# Patient Record
Sex: FEMALE | Race: WHITE | NOT HISPANIC OR LATINO | Employment: OTHER | URBAN - METROPOLITAN AREA
[De-identification: names, ages, dates, MRNs, and addresses within clinical notes are randomized per-mention and may not be internally consistent; named-entity substitution may affect disease eponyms.]

---

## 2021-05-28 ENCOUNTER — RECORDS - HEALTHEAST (OUTPATIENT)
Dept: ADMINISTRATIVE | Facility: CLINIC | Age: 71
End: 2021-05-28

## 2022-07-01 ENCOUNTER — HOSPITAL ENCOUNTER (EMERGENCY)
Facility: CLINIC | Age: 72
Discharge: HOME | End: 2022-07-01
Attending: EMERGENCY MEDICINE | Admitting: EMERGENCY MEDICINE
Payer: MEDICARE

## 2022-07-01 ENCOUNTER — APPOINTMENT (OUTPATIENT)
Dept: CT IMAGING | Facility: CLINIC | Age: 72
End: 2022-07-01
Attending: EMERGENCY MEDICINE
Payer: MEDICARE

## 2022-07-01 VITALS
WEIGHT: 104 LBS | DIASTOLIC BLOOD PRESSURE: 73 MMHG | HEIGHT: 61 IN | TEMPERATURE: 97.7 F | OXYGEN SATURATION: 89 % | RESPIRATION RATE: 25 BRPM | BODY MASS INDEX: 19.63 KG/M2 | SYSTOLIC BLOOD PRESSURE: 155 MMHG | HEART RATE: 84 BPM

## 2022-07-01 DIAGNOSIS — R55 SYNCOPE, UNSPECIFIED SYNCOPE TYPE: ICD-10-CM

## 2022-07-01 PROCEDURE — 70450 CT HEAD/BRAIN W/O DYE: CPT

## 2022-07-01 PROCEDURE — 99284 EMERGENCY DEPT VISIT MOD MDM: CPT | Mod: 25

## 2022-07-01 PROCEDURE — 93005 ELECTROCARDIOGRAM TRACING: CPT | Performed by: EMERGENCY MEDICINE

## 2022-07-02 NOTE — ED TRIAGE NOTES
Arrival via EMS for LOC and fall at home. Was at [facility] earlier today for possible GI bleed, was sent home and lost consciousness getting out of the car, and possibly again once in the house. EMS noticed difficulty arousing patient and pinpoint pupils, narcan administered en route to the hospital. Pt currently alert and oriented, no obvious bruising or lacerations to the scalp but pt does not remember if she hit her head or not.      Triage Assessment     Row Name 07/01/22 2058       Triage Assessment (Adult)    Airway WDL WDL       Respiratory WDL    Respiratory WDL WDL       Skin Circulation/Temperature WDL    Skin Circulation/Temperature WDL WDL       Cardiac WDL    Cardiac WDL WDL       Peripheral/Neurovascular WDL    Peripheral Neurovascular WDL WDL       Cognitive/Neuro/Behavioral WDL    Cognitive/Neuro/Behavioral WDL X;arousability;level of consciousness    Level of Consciousness lethargic;confused  PTA       [place] Coma Scale    Best Eye Response 4-->(E4) spontaneous    Best Motor Response 6-->(M6) obeys commands    Best Verbal Response 5-->(V5) oriented    Long Coma Scale Score 15

## 2022-07-02 NOTE — DISCHARGE INSTRUCTIONS
Your symptoms tonight likely resulted from the effects of the narcotic pain medication you received earlier.  Your pupils were very small and responded to the Narcan and you became appropriate.  CT imaging was unremarkable.

## 2022-07-02 NOTE — ED NOTES
Placed orders for EKG.  Date of exam was on 07/01/2022 at 21:12  Lili Yañez RN 7/2/2022 3:36 AM

## 2022-07-02 NOTE — ED PROVIDER NOTES
EMERGENCY DEPARTMENT ENCOUNTER      NAME: Darleen France  AGE: 72 year old female  YOB: 1950  MRN: [id number]  EVALUATION DATE & TIME: 7/1/2022  8:45 PM    PCP: No primary care provider on file.    ED PROVIDER: Jairo Jeffries M.D.      Chief Complaint   Patient presents with     Loss of Consciousness     Fall         FINAL IMPRESSION:  Narcotic excess  Fall  Hypoxia    ED COURSE & MEDICAL DECISION MAKING:    Pertinent Labs & Imaging studies reviewed. (See chart for details)  72 year old female presents to the Emergency Department for evaluation of altered mentation/syncope.  Patient had returned home after extensive evaluation at [facility] which was essentially unremarkable.  Per report on arriving home she was markedly altered and fell/syncopized.  EMS was called.  Pinpoint pupils noted.  Given Narcan with improvement in wellbeing.  Patient made a trauma alert here as she does take Plavix routinely.  Uncertain if she hit her head.  We will proceed with CT imaging.  On limited exam patient sitting comfortably on a commode.  No outward signs of scalp injury.  Will complete exam after imaging.. Patient appears non toxic with stable vitals signs.  EKG obtained on arrival is unremarkable    9:05 PM I met with the patient for the initial interview and physical examination. Discussed plan for treatment and workup in the ED.    9:49 PM Reevaluated patient.  Patient reports stumbling and falling.  Uncertain as to the circumstances.  Patient did receiving pain medications during her work-up at [facility].  This likely the source of her altered mentation with response to Narcan.  Plan is for discharge if CT imaging is normal.  Patient exam is benign..  10:02 PM.  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  Awaiting definitive report.  10:07 PM.  CT unremarkable.  We will proceed with outpatient management.  10:10 PM.  Patient being prepared for discharge.  Nurse reports  patient was resting oxygenation dropped to the low 80% range.  Placed on 2 L with normalization.  Patient reports not being on oxygen typically.  Patient had been oxygenating appropriately when not at rest and talking.  We will ambulate patient.  Could be transient hypoxia due to the resting state.  Patient remains alert and appropriate.  10:20 PM.  Patient ambulated.  Oxygenation remained acceptable with lowest being 89%.  In the setting COPD this is not unremarkable.  We will continue outpatient management.  Recommendation follow-up with her pulmonologist.  At the conclusion of the encounter I discussed the results of all of the tests and the disposition. The questions were answered and return precautions provided. The patient or family acknowledged understanding and was agreeable with the care plan.       PPE: Provider wore gloves, N95 mask, eye protection, surgical cap.     MEDICATIONS GIVEN IN THE EMERGENCY:  Medications - No data to display    NEW PRESCRIPTIONS STARTED AT TODAY'S ER VISIT  New Prescriptions    No medications on file          =================================================================    HPI    Patient information was obtained from: Nurse    Use of Intrepreter: N/A          Darleen France is a 72 year old female with a pertient medical history of HLD and DM who presents to the ED for evaluation of fall.    Upon chart review: Patient was seen  7/1/22 at [facility] ED for evaluation of abdominal pain. CT abdomen pelvis with contrast impressions were (1) large hiatal hernia and (2) no  Abscess or obvious bowel inflammation. Patient was discharged home with return precautions.     As patient arrived home following discharge from the ED she lost consciousness getting out of her car. EMS was called and upon arrival  Patient was difficult to arouse and she had noted pinpoint pupils. EMS administered narcan en route which improved patient's responsiveness. Patient is unsure if she hit her head, and  she does not note any lacerations, bruising, or radiating/localized pain. Patient is on blood thinners. No other complaints at this time.       REVIEW OF SYSTEMS   Constitutional:  Denies fever, chills  Respiratory:  Denies productive cough or increased work of breathing  Cardiovascular:  Denies chest pain, palpitations  GI:  Denies abdominal pain, nausea, vomiting, or change in bowel or bladder habits   Musculoskeletal:  Denies any new muscle/joint swelling  Skin:  Denies rash   Neurologic:  Denies focal weakness. Positive for loss of consciousness.   All systems negative except as marked.     PAST MEDICAL HISTORY:  No past medical history on file.    PAST SURGICAL HISTORY:  No past surgical history on file.      CURRENT MEDICATIONS:    No current facility-administered medications for this encounter.  No current outpatient medications on file.    ALLERGIES:  Not on File    FAMILY HISTORY:  No family history on file.    SOCIAL HISTORY:   Social History     Socioeconomic History     Marital status:        VITALS:  Patient Vitals for the past 24 hrs:   BP Temp Temp src Pulse Resp SpO2   07/01/22 2049 (!) 156/73 97.7  F (36.5  C) Oral 82 16 94 %        PHYSICAL EXAM    Constitutional:  Awake, alert, in no apparent distress  HENT:  Normocephalic, Atraumatic. Bilateral external ears normal. Oropharynx moist. Nose normal. Neck- Normal range of motion with no guarding, No midline cervical tenderness, Supple, No stridor.   Eyes:  PERRL, EOMI with no signs of entrapment, Conjunctiva normal, No discharge.   Respiratory:  Normal breath sounds, No respiratory distress, No wheezing.    Cardiovascular:  Normal heart rate, Normal rhythm, No appreciable rubs or gallops.   GI:  Soft, No tenderness, No distension, No palpable masses  Musculoskeletal:  Intact distal pulses, No edema. Good range of motion in all major joints. No tenderness to palpation or major deformities noted.  Integument:  Warm, Dry, No erythema, No rash.    Neurologic:  Alert & oriented, Normal motor function, Normal sensory function, No focal deficits noted.   Psychiatric:  Affect normal, Judgment normal, Mood normal.     LAB:  All pertinent labs reviewed and interpreted.       RADIOLOGY:  Reviewed all pertinent imaging. Please see official radiology report.  Head CT w/o contrast    Result Date: 7/1/2022  EXAM: CT HEAD W/O CONTRAST LOCATION: [facility] DATE/TIME: 7/1/2022 9:19 PM INDICATION: Syncope, takes blood thinners COMPARISON: 07/21/2020 TECHNIQUE: Routine CT Head without IV contrast. Multiplanar reformats. Dose reduction techniques were used. FINDINGS: INTRACRANIAL CONTENTS: No intracranial hemorrhage, extraaxial collection, or mass effect.  No CT evidence of acute infarct. Chronic encephalomalacia and gliosis in the left temporal lobe. Normal ventricles and sulci. VISUALIZED ORBITS/SINUSES/MASTOIDS: No intraorbital abnormality. No paranasal sinus mucosal disease. No middle ear or mastoid effusion. BONES/SOFT TISSUES: No acute abnormality.     IMPRESSION: 1.  No acute intracranial process.    EKG:    Normal sinus rhythm.  Rate of 75.  Normal QRS.  Normal ST segments.  No prior tracing.  I have independently reviewed and interpreted the EKG(s) documented above.        I, Carey Long, am serving as a scribe to document services personally performed by Jairo Jeffries MD, based on my observation and the provider's statements to me. I, Jairo Jeffries MD attest that Carey Long is acting in a scribe capacity, has observed my performance of the services and has documented them in accordance with my direction.    Jairo Jeffries M.D.  Emergency Medicine  [facility] EMERGENCY ROOM     Jairo Jeffries MD  07/01/22 2207       Jairo Jeffries MD  07/01/22 2209       Jairo Jeffries MD  07/01/22 2217

## 2022-07-02 NOTE — ED NOTES
Patient ambulated about 30 feet with no difficulties   Denies feeling lightheaded/dizzy, or feeling like passing out   Oxygen saturation remained between 89-92% while walking   Heart rate remained between 92-95 BPM while walking   ED physician notified   ED physician ok for patient to go home

## 2023-01-08 ENCOUNTER — NURSE TRIAGE (OUTPATIENT)
Dept: NURSING | Facility: CLINIC | Age: 73
End: 2023-01-08

## 2023-01-08 ENCOUNTER — HOSPITAL ENCOUNTER (EMERGENCY)
Facility: CLINIC | Age: 73
Discharge: HOME | End: 2023-01-08
Attending: EMERGENCY MEDICINE | Admitting: EMERGENCY MEDICINE
Payer: MEDICARE

## 2023-01-08 ENCOUNTER — APPOINTMENT (OUTPATIENT)
Dept: RADIOLOGY | Facility: CLINIC | Age: 73
End: 2023-01-08
Attending: EMERGENCY MEDICINE
Payer: MEDICARE

## 2023-01-08 VITALS
WEIGHT: 108 LBS | HEIGHT: 61 IN | HEART RATE: 80 BPM | OXYGEN SATURATION: 93 % | TEMPERATURE: 98.6 F | BODY MASS INDEX: 20.39 KG/M2 | RESPIRATION RATE: 18 BRPM | SYSTOLIC BLOOD PRESSURE: 154 MMHG | DIASTOLIC BLOOD PRESSURE: 74 MMHG

## 2023-01-08 DIAGNOSIS — S52.531A CLOSED COLLES' FRACTURE OF RIGHT RADIUS, INITIAL ENCOUNTER: ICD-10-CM

## 2023-01-08 PROCEDURE — 99284 EMERGENCY DEPT VISIT MOD MDM: CPT | Mod: 25

## 2023-01-08 PROCEDURE — 73110 X-RAY EXAM OF WRIST: CPT | Mod: RT

## 2023-01-08 PROCEDURE — 25600 CLTX DST RDL FX/EPHYS SEP WO: CPT | Mod: RT

## 2023-01-08 PROCEDURE — 250N000013 HC RX MED GY IP 250 OP 250 PS 637: Performed by: EMERGENCY MEDICINE

## 2023-01-08 RX ORDER — OXYCODONE HYDROCHLORIDE 5 MG/1
5 TABLET ORAL ONCE
Status: COMPLETED | OUTPATIENT
Start: 2023-01-08 | End: 2023-01-08

## 2023-01-08 RX ORDER — TRAMADOL HYDROCHLORIDE 50 MG/1
50 TABLET ORAL ONCE
Status: DISCONTINUED | OUTPATIENT
Start: 2023-01-08 | End: 2023-01-08

## 2023-01-08 RX ORDER — OXYCODONE HYDROCHLORIDE 5 MG/1
5 TABLET ORAL EVERY 6 HOURS PRN
Qty: 12 TABLET | Refills: 0 | Status: SHIPPED | OUTPATIENT
Start: 2023-01-08 | End: 2023-01-11

## 2023-01-08 RX ADMIN — OXYCODONE HYDROCHLORIDE 5 MG: 5 TABLET ORAL at 17:07

## 2023-01-08 ASSESSMENT — ACTIVITIES OF DAILY LIVING (ADL): ADLS_ACUITY_SCORE: 35

## 2023-01-08 ASSESSMENT — ENCOUNTER SYMPTOMS
HEADACHES: 0
NAUSEA: 0

## 2023-01-08 NOTE — ED TRIAGE NOTES
Pt fell today on the ice and landed on her wrist with her arms outstretched. On thinners denies hitting head or LOC. Rt wrist deformity.      Triage Assessment     Row Name 01/08/23 7723       Triage Assessment (Adult)    Airway WDL WDL       Respiratory WDL    Respiratory WDL WDL       Skin Circulation/Temperature WDL    Skin Circulation/Temperature WDL WDL       Cardiac WDL    Cardiac WDL WDL       Peripheral/Neurovascular WDL    Peripheral Neurovascular WDL WDL       Cognitive/Neuro/Behavioral WDL    Cognitive/Neuro/Behavioral WDL WDL

## 2023-01-08 NOTE — ED PROVIDER NOTES
EMERGENCY DEPARTMENT ENCOUNTER      NAME: Darleen France  AGE: 72 year old female  YOB: 1950  MRN: [id number]  EVALUATION DATE & TIME: 1/8/2023  4:33 PM    PCP: Yolanda Barahona    ED PROVIDER: aRul Estrada MD     Chief Complaint   Patient presents with     Fall     FINAL IMPRESSION:  1. Closed Colles' fracture of right radius, initial encounter      ED COURSE & MEDICAL DECISION MAKING:    Pertinent Labs & Imaging studies reviewed. (See chart for details)  72 year old female presents to the Emergency Department for evaluation of injury sustained in a slip and fall on ice.  Patient takes Plavix.  She slipped on ice and fell landing on an outstretched hand.  Her only injury was to the right wrist.  She landed on an outstretched.  There was no head injury.  Presents to the emergency department with wrist pain.  Tenderness to palpation on the dorsal aspect of the right wrist some minor soft tissue swelling no severe deformity appreciated.  Distal neurovascular intact.  No other injury no headache no neck pain no back pain at this point I do not feel that CT scan imaging is indicated based on low pretest probability and the mechanism of patient's injury.  X-ray performed demonstrating distal radius fracture.  No reduction indicated at this time.  Patient placed in the splint.  Please see procedural note below.  Plan for discharge home with outpatient follow-up with orthopedics.       4:35 PM I met with the patient to gather history and to perform my initial exam. We discussed plans for the ED course, including diagnostic testing and treatment.   5:49 PM I performed a splint procedure on the patient.    Medical Decision Making    History:    Supplemental history from: Documented in HPI, if applicable    External Record(s) reviewed: Documented in HPI, if applicable.    Work Up:    Chart documentation includes differential considered and any EKGs or imaging independently interpreted by  provider.    In additional to work up documented, I considered the following work up: See chart documentation, if applicable.    External consultation:    Discussion of management with another provider: See chart documentation, if applicable    Complicating factors:    Care impacted by chronic illness: N/A    Care affected by social determinants of health: N/A    Disposition considerations: Discharge. I prescribed additional prescription strength medication(s) as charted. N/A.      At the conclusion of the encounter I discussed the results of all of the tests and the disposition. The questions were answered. The patient or family acknowledged understanding and was agreeable with the care plan.       MEDICATIONS GIVEN IN THE EMERGENCY:  Medications   oxyCODONE (ROXICODONE) tablet 5 mg (5 mg Oral Given 1/8/23 1707)       NEW PRESCRIPTIONS STARTED AT TODAY'S ER VISIT  New Prescriptions    OXYCODONE (ROXICODONE) 5 MG TABLET    Take 1 tablet (5 mg) by mouth every 6 hours as needed for pain          =================================================================    HPI    Patient information was obtained from: patient     Use of : N/A        Darleen France is a 72 year old female with a pertinent history of diabetes and hyperlipidemia who presents to this ED by EMS for evaluation of fall.    Patient reports a fall on ice just prior to arrival in the ED today. Patient landed on her wrist with her arms outstretched and now endorses right wrist pain. She denies hitting her head during the fall.     Patient is on Clavex. She states she can't have ibuprofen or tylenol.     Patient denies headache or nausea. No other medical concerns are expressed at this time.       REVIEW OF SYSTEMS   Review of Systems   Constitutional:        Positive for fall.    Gastrointestinal: Negative for nausea.   Musculoskeletal: Myalgias: right wrist pain.   Neurological: Negative for headaches.   All other systems reviewed and are  "negative.       PAST MEDICAL HISTORY:  History reviewed. No pertinent past medical history.    PAST SURGICAL HISTORY:  History reviewed. No pertinent surgical history.        CURRENT MEDICATIONS:    oxyCODONE (ROXICODONE) 5 MG tablet        ALLERGIES:  Allergies   Allergen Reactions     Tetracyclines Anaphylaxis     Acetaminophen      Other reaction(s): Gastrointestinal, GI Upset     Cafergot Other (See Comments)     Leg pain     Ergotamine Unknown     Ibuprofen Other (See Comments)     Blood in urine       Ergot Alkaloids Rash     Latex Rash       FAMILY HISTORY:  History reviewed. No pertinent family history.    SOCIAL HISTORY:   Social History     Socioeconomic History     Marital status:        VITALS:  BP (!) 154/74   Pulse 80   Temp 98.6  F (37  C) (Temporal)   Resp 18   Ht 1.549 m (5' 1\")   Wt 49 kg (108 lb)   LMP  (LMP Unknown)   SpO2 93%   BMI 20.41 kg/m      PHYSICAL EXAM    PHYSICAL EXAM    VITAL SIGNS: BP (!) 154/74   Pulse 80   Temp 98.6  F (37  C) (Temporal)   Resp 18   Ht 1.549 m (5' 1\")   Wt 49 kg (108 lb)   LMP  (LMP Unknown)   SpO2 93%   BMI 20.41 kg/m    Constitutional:  Well developed, well nourished   EYES: Conjunctivae clear, no discharge  HENT: Atraumatic, normocephalic, bilateral external ears normal.  Oropharynx moist. Nose normal.   Neck: Normal ROM , Supple   Respiratory:  No respiratory distress, normal nonlabored respirations.   Cardiovascular:  Distal perfusion appears intact  Musculoskeletal: Tenderness to palpation in the right wrist with mild soft tissue swelling distal neurovascular intact  Integument:  Warm, Dry, No erythema, No rash.   Neurologic:  Alert and oriented. No focal deficits noted.  Ambulatory  Psychiatric:  Affect normal, Judgment normal, Mood normal.       LAB:  All pertinent labs reviewed and interpreted.  Results for orders placed or performed during the hospital encounter of 01/08/23   XR Wrist Right G/E 3 Views    Impression    IMPRESSION: " Fracture of the distal aspect of the right radius. No significant impaction. Slight dorsal angulation. No disruption of the articular margin of the wrist joint. Fracture of the tip of the ulnar styloid. No carpal fractures are identified.   Degenerative change at the first CMC joint.       RADIOLOGY:  Reviewed all pertinent imaging. Please see official radiology report.  XR Wrist Right G/E 3 Views   Final Result   IMPRESSION: Fracture of the distal aspect of the right radius. No significant impaction. Slight dorsal angulation. No disruption of the articular margin of the wrist joint. Fracture of the tip of the ulnar styloid. No carpal fractures are identified.    Degenerative change at the first CMC joint.      .    PROCEDURES:     PROCEDURE: Splint Placement   INDICATIONS: right radius fracture   PROCEDURE PROVIDER: Dr Raul Estrada   NOTE:  A short arm and volar wrist splint made of Plaster was applied to the Right upper extremity by the above provider. As noted in the physical exam, distal CMS was intact prior to placement. The splint was checked and the fit was adjusted to ensure proper positioning after placement. Sensation and circulation, as well as motor function, are unchanged after splint placement and the patient is more comfortable with the splint in place.          I, Lorene Pappas, am serving as a scribe to document services personally performed by Lorene Pappas based on my observation and the provider's statements to me. I, Raul Estrada MD, attest that Lorene Pappas is acting in a scribe capacity, has observed my performance of the services and has documented them in accordance with my direction.    Raul Estrada MD   [facility] EMERGENCY ROOM  [address] [id number] [phone]     Raul Estrada MD  01/08/23 0456

## 2023-01-09 NOTE — DISCHARGE INSTRUCTIONS
Follow-up with [facility] orth[facility]ics call tomorrow for appointment.  Take pain medication as prescribed.

## 2023-01-09 NOTE — TELEPHONE ENCOUNTER
Darleen reports numbness to the fingers on her Rt hand since being seen in the ER earlier today for a Fracture.    - Has not been elevating or applying ice  - Fingers slightly lighter in color    Advised elevating hand higher than level of heart to decrease swelling.  She states, my fingers are not swollen; They're numb.  Advised that swelling could be the cause of the numbness.    Reviewed AVS instructions:  Home care  Follow these guidelines when caring for yourself at home:  - Keep your arm elevated to reduce pain and swelling. When sitting or lying down, keep your arm above the level of  your heart. You can do this by placing your arm on a pillow that rests on your chest or on a pillow at your side.  This is most important during the first 2 days (48 hours) after the injury.    - Put an ice pack on the injured area. Do this for 20 minutes every 1 to 2 hours the first day. You can make an ice pack by wrapping a plastic bag of ice cubes in a thin towel. As the ice melts, make sure that the cast or splintdoesn t get wet. You can place the ice pack inside the sling and directly over the splint or cast. Continue to use the  ice pack 3 to 4 times a day for the next 2 days. Then use the ice pack as needed to ease pain and swelling.    - Always keep the cast or splint completely dry. Bathe with your cast or splint out of the water. . . .    - You may use acetaminophen or ibuprofen to control pain, unless another pain medicine was prescribed. . . .    At this point, Darleen states that earlier, she accidentally twisted her right wrist when she attempted to reach for something that had fallen.  She reports that she has had significant pain since that time.    Advised to return to ER for evaluation    Demi Palencia RN  [facility]      Reason for Disposition    Sounds like a serious complication to the triager    Protocols used: RECENT MEDICAL VISIT FOR INJURY FOLLOW-UP CALL-A-JEANMARIE